# Patient Record
Sex: FEMALE | Race: WHITE | NOT HISPANIC OR LATINO | Employment: OTHER | URBAN - METROPOLITAN AREA
[De-identification: names, ages, dates, MRNs, and addresses within clinical notes are randomized per-mention and may not be internally consistent; named-entity substitution may affect disease eponyms.]

---

## 2023-09-20 ENCOUNTER — HOSPITAL ENCOUNTER (OUTPATIENT)
Dept: DATA CONVERSION | Facility: HOSPITAL | Age: 88
End: 2023-09-20
Attending: STUDENT IN AN ORGANIZED HEALTH CARE EDUCATION/TRAINING PROGRAM | Admitting: STUDENT IN AN ORGANIZED HEALTH CARE EDUCATION/TRAINING PROGRAM
Payer: MEDICARE

## 2023-09-20 DIAGNOSIS — N39.3 STRESS INCONTINENCE (FEMALE) (MALE): ICD-10-CM

## 2023-09-20 LAB
ANION GAP IN SER/PLAS: 11 MMOL/L (ref 10–20)
CALCIUM (MG/DL) IN SER/PLAS: 9.9 MG/DL (ref 8.6–10.3)
CARBON DIOXIDE, TOTAL (MMOL/L) IN SER/PLAS: 29 MMOL/L (ref 21–32)
CHLORIDE (MMOL/L) IN SER/PLAS: 104 MMOL/L (ref 98–107)
CREATININE (MG/DL) IN SER/PLAS: 1.07 MG/DL (ref 0.5–1.05)
ERYTHROCYTE DISTRIBUTION WIDTH (RATIO) BY AUTOMATED COUNT: 16.6 % (ref 11.5–14.5)
ERYTHROCYTE MEAN CORPUSCULAR HEMOGLOBIN CONCENTRATION (G/DL) BY AUTOMATED: 32.2 G/DL (ref 32–36)
ERYTHROCYTE MEAN CORPUSCULAR VOLUME (FL) BY AUTOMATED COUNT: 90 FL (ref 80–100)
ERYTHROCYTES (10*6/UL) IN BLOOD BY AUTOMATED COUNT: 4.1 X10E12/L (ref 4–5.2)
GFR FEMALE: 50 ML/MIN/1.73M2
GLUCOSE (MG/DL) IN SER/PLAS: 121 MG/DL (ref 74–99)
HEMATOCRIT (%) IN BLOOD BY AUTOMATED COUNT: 37 % (ref 36–46)
HEMOGLOBIN (G/DL) IN BLOOD: 11.9 G/DL (ref 12–16)
LEUKOCYTES (10*3/UL) IN BLOOD BY AUTOMATED COUNT: 8.7 X10E9/L (ref 4.4–11.3)
PLATELETS (10*3/UL) IN BLOOD AUTOMATED COUNT: 198 X10E9/L (ref 150–450)
POCT GLUCOSE: 111 MG/DL (ref 74–99)
POTASSIUM (MMOL/L) IN SER/PLAS: 3.4 MMOL/L (ref 3.5–5.3)
SODIUM (MMOL/L) IN SER/PLAS: 141 MMOL/L (ref 136–145)
UREA NITROGEN (MG/DL) IN SER/PLAS: 22 MG/DL (ref 6–23)

## 2023-09-21 LAB
ATRIAL RATE: 76 BPM
P AXIS: -19 DEGREES
PR INTERVAL: 185 MS
Q ONSET: 253 MS
QRS COUNT: 12 BEATS
QRS DURATION: 153 MS
QT INTERVAL: 424 MS
QTC CALCULATION(BAZETT): 477 MS
QTC FREDERICIA: 458 MS
R AXIS: 34 DEGREES
T AXIS: 8 DEGREES
T OFFSET: 465 MS
VENTRICULAR RATE: 76 BPM

## 2023-09-29 VITALS — WEIGHT: 159.83 LBS | HEIGHT: 61 IN | BODY MASS INDEX: 30.18 KG/M2

## 2023-09-30 NOTE — H&P
History of Present Illness:   History Present Illness:  Reason for surgery: JOSEPH   HPI:    Ms. Hernández is an 89yo with JOSEPH who presents for urethral bulking with Bulkamid.      Hx also notable for:   UUI:  Has tried Ditropan Myrbetriq and PTNS with no improvement  s/p botox 100 units, 1/13/21, 8/17/2021  Continue Premarin cream   doing great with SNM     Stress incontinence:  Wants to proceed with bulking        Allergies:        Allergies:  ·  No Known Allergies :     Home Medication Review:   Home Medications Reviewed: yes     Impression/Procedure:   ·  Impression and Planned Procedure: Bulkamid       ERAS (Enhanced Recovery After Surgery):  ·  ERAS Patient: no       Physical Exam by System:    Constitutional: A&Ox3   Respiratory/Thorax: No increased work of breathing,  comfortable on RA   Cardiovascular: Regular rate and rhythm   Gastrointestinal: Soft, non-tender   Genitourinary: Per HPI   Musculoskeletal: Normal ROM   Neurological: No acute deficits   Skin: No lesions     Consent:   COVID-19 Consent:  ·  COVID-19 Risk Consent Surgeon has reviewed key risks related to the risk of fan COVID-19 and if they contract COVID-19 what the risks are.     Attestation:   Note Completion:  I am a:  Resident/Fellow   Attending Attestation I saw and evaluated the patient.  I personally obtained the key and critical portions of the history and physical exam or was physically present for key and  critical portions performed by the resident/fellow. I reviewed the resident/fellow?s documentation and discussed the patient with the resident/fellow.  I agree with the resident/fellow?s medical decision making as documented in the note.     I personally evaluated the patient on 20-Sep-2023         Electronic Signatures:  Lorna Jones (Fellow))  (Signed 19-Sep-2023 16:33)   Authored: History of Present Illness, Allergies, Home  Medication Review, Impression/Procedure, ERAS, Physical Exam, Consent, Note  Completion  Abimael Umana)  (Signed 20-Sep-2023 08:25)   Authored: Note Completion   Co-Signer: History of Present Illness, Allergies, Home Medication Review, Impression/Procedure, ERAS, Physical Exam, Consent, Note Completion      Last Updated: 20-Sep-2023 08:25 by Abimael Umana)

## 2023-10-01 NOTE — OP NOTE
Post Operative Note:     PreOp Diagnosis: az   Post-Procedure Diagnosis: same   Procedure: 1. urethral bulking   Surgeon: tashia   Resident/Fellow/Other Assistant: none   Anesthesia: ga   Estimated Blood Loss (mL): none   Specimen: no   Findings: good cooptation     Operative Report Dictated:  Dictation: not applicable - note contains Operative  Report   Operative Report:    After the patient was placed under general anesthesia she was prepped and draped in the usual sterile fashion.     At the start of the end rotatable Bulkamid sheath and the water inflow was adjusted to produce an adequate stream. The Bulkamid Needle was then placed ¾ of the way into the needle channel of the rotatable sheath and the entire Bulkamid system was then  advanced into the urethra until the bladder is visualised and inspected. The Bulkamid needle was then advanced into the needle channel on the rotatable sheath until the tip of the sheath is adjacent to the bladder neck.    The sheath was then rotated to the 7 o?clock position. The needed was then extend into the bladder until the 2cm juni on the needle is visible. The Bulkamid system was then retracted until the tip of the needle was resting on the bladder neck.  The needle was then retracted into the sheath and approximately 1.5cm from the bladder neck the Bulkamid system was pressed parallel against the urethral wall and the needle is then advanced into the submucosal tissue ensuring that the bevel of the needle  was facing towards the lumen. The needle was advanced approximately 0.5cm, and the Bulkamid hydrogel was then injected until the Bulkamid cushion was visible and reached the midline of the urethral lumen.    The needle was then retracted back into the rotatable sheath, and rotated to the next injection site. Subsequent injections were preformed at 5 o?clock, 2 o?clock and 10 o?clock all along the same plane as the original injection until  all (4) cushions met at the  midline of the urethral lumen. 2mls Total of Bulkamid Hydrogel was used.    Approximately 500cc of fluid was left in the bladder and upon completion, the patient emptied her bladder without issues. The procedure was well tolerated and EBL was minimal.    Patient was given instruction to contact the office if she had any difficulty urinating and was consulted about the potential of a ?top up? procedure if the desired effect was not achieved. Prophylactic antibiotics were prescribed, and a  follow up appointment was scheduled to evaluate improvement.        Electronic Signatures:  Abimael Umana)  (Signed 20-Sep-2023 17:48)   Authored: Post Operative Note, Note Completion      Last Updated: 20-Sep-2023 17:48 by Abimael Umana)

## 2023-10-18 ENCOUNTER — ANCILLARY PROCEDURE (OUTPATIENT)
Dept: RADIOLOGY | Facility: CLINIC | Age: 88
End: 2023-10-18
Payer: MEDICARE

## 2023-10-18 DIAGNOSIS — R06.2 WHEEZING: ICD-10-CM

## 2023-10-18 PROCEDURE — 71046 X-RAY EXAM CHEST 2 VIEWS: CPT | Mod: FY

## 2023-10-18 PROCEDURE — 71046 X-RAY EXAM CHEST 2 VIEWS: CPT | Performed by: RADIOLOGY

## 2023-11-14 ENCOUNTER — OFFICE VISIT (OUTPATIENT)
Dept: UROLOGY | Facility: CLINIC | Age: 88
End: 2023-11-14
Payer: MEDICARE

## 2023-11-14 DIAGNOSIS — N32.81 OAB (OVERACTIVE BLADDER): Primary | ICD-10-CM

## 2023-11-14 DIAGNOSIS — N39.3 SUI (STRESS URINARY INCONTINENCE, FEMALE): ICD-10-CM

## 2023-11-14 PROCEDURE — 99214 OFFICE O/P EST MOD 30 MIN: CPT | Performed by: STUDENT IN AN ORGANIZED HEALTH CARE EDUCATION/TRAINING PROGRAM

## 2023-11-14 NOTE — PROGRESS NOTES
"CHIEF COMPLAINT: POV 7 weeks           HISTORY OF PRESENT ILLNESS:  This is a 88 y.o. female, who presents with a 7 week post operative visit. Patient is using her [employer] device and she does have less frequency and less leakage bladder symptoms. Patient is using pads but it varies with how much wetness there is on the pad. She is not getting up as much at nighttime. She has had at least one Botox treatment. This past September she had a urethral bulking procedure too. She states when she sits for long periods of time and then gets up is when she realized she has to go urinate. It is about half the time she is hurrying to the bathroom. She is going about 8 times per day. She is taking Gemtesa currently.              HISTORY OF PRESENT ILLNESS:           Past Medical History  She has a past medical history of Collagenous colitis (04/18/2022), Personal history of other specified conditions (06/15/2022), and Personal history of other specified conditions (06/15/2022).    Surgical History  She has a past surgical history that includes Other surgical history (07/30/2020); Other surgical history (07/30/2020); Other surgical history (07/30/2020); Other surgical history (08/10/2020); and Other surgical history (08/10/2020).     Social History  She has no history on file for tobacco use, alcohol use, and drug use.    Family History  No family history on file.     Allergies  Patient has no allergy information on record.      A comprehensive 10+ review of systems was negative except for: see hpi                          PHYSICAL EXAMINATION:  BP Readings from Last 3 Encounters:   05/09/23 137/81   04/13/23 180/82   05/13/22 146/73      Wt Readings from Last 3 Encounters:   05/09/23 69.4 kg (153 lb)   05/27/22 66 kg (145 lb 8.1 oz)   05/13/22 66 kg (145 lb 8.1 oz)      BMI: Estimated body mass index is 30.29 kg/m² as calculated from the following:    Height as of 9/20/23: 1.547 m (5' 0.91\").    Weight as of 9/20/23: 72.5 kg " "(159 lb 13.3 oz).  BSA: Estimated body surface area is 1.77 meters squared as calculated from the following:    Height as of 9/20/23: 1.547 m (5' 0.91\").    Weight as of 9/20/23: 72.5 kg (159 lb 13.3 oz).  HEENT: Normocephalic, atraumatic, PER EOMI, nonicteric, trachea normal, thyroid normal, oropharynx normal.  CARDIAC: regular rate & rhythm, S1 & S2 normal.  No heaves, thrills, gallops or murmurs.  LUNGS: Clear to auscultation, no spinal or CV tenderness.  EXTREMITIES: No evidence of cyanosis, clubbing or edema.                     Assessment:    88-year-old with urinary urge incontinence, likely underactive detrusor, and fecal incontinence     UUI:  Has tried Ditropan Myrbetriq and PTNS with no improvement  s/p botox 100 units, 1/13/21, 8/17/2021  Continue Premarin cream   doing great with SNM   Start timed voiding, and continue gemtesa       Follow up in 3 months    Stress incontinence:  S/p bulking  9/20/23 with marked improvement     Abimael Umana MD  "

## 2023-12-15 ENCOUNTER — DOCUMENTATION (OUTPATIENT)
Dept: UROLOGY | Facility: CLINIC | Age: 88
End: 2023-12-15
Payer: MEDICARE

## 2024-02-05 ENCOUNTER — LAB (OUTPATIENT)
Dept: LAB | Facility: LAB | Age: 89
End: 2024-02-05
Payer: MEDICARE

## 2024-02-05 DIAGNOSIS — N18.9 CHRONIC KIDNEY DISEASE, UNSPECIFIED: Primary | ICD-10-CM

## 2024-02-05 LAB
APPEARANCE UR: CLEAR
BILIRUB UR STRIP.AUTO-MCNC: NEGATIVE MG/DL
COLOR UR: YELLOW
GLUCOSE UR STRIP.AUTO-MCNC: NEGATIVE MG/DL
KETONES UR STRIP.AUTO-MCNC: NEGATIVE MG/DL
LEUKOCYTE ESTERASE UR QL STRIP.AUTO: NEGATIVE
NITRITE UR QL STRIP.AUTO: NEGATIVE
PH UR STRIP.AUTO: 7 [PH]
PROT UR STRIP.AUTO-MCNC: NEGATIVE MG/DL
RBC # UR STRIP.AUTO: NEGATIVE /UL
SP GR UR STRIP.AUTO: 1.01
UROBILINOGEN UR STRIP.AUTO-MCNC: <2 MG/DL

## 2024-02-05 PROCEDURE — 81003 URINALYSIS AUTO W/O SCOPE: CPT

## 2024-02-05 PROCEDURE — 80048 BASIC METABOLIC PNL TOTAL CA: CPT

## 2024-02-05 PROCEDURE — 83521 IG LIGHT CHAINS FREE EACH: CPT

## 2024-02-05 PROCEDURE — 84156 ASSAY OF PROTEIN URINE: CPT

## 2024-02-05 PROCEDURE — 36415 COLL VENOUS BLD VENIPUNCTURE: CPT

## 2024-02-05 PROCEDURE — 82570 ASSAY OF URINE CREATININE: CPT

## 2024-02-06 LAB
ANION GAP SERPL CALC-SCNC: 12 MMOL/L (ref 10–20)
BUN SERPL-MCNC: 27 MG/DL (ref 6–23)
CALCIUM SERPL-MCNC: 9.5 MG/DL (ref 8.6–10.3)
CHLORIDE SERPL-SCNC: 104 MMOL/L (ref 98–107)
CO2 SERPL-SCNC: 30 MMOL/L (ref 21–32)
CREAT SERPL-MCNC: 1.17 MG/DL (ref 0.5–1.05)
CREAT UR-MCNC: 47.9 MG/DL (ref 20–320)
EGFRCR SERPLBLD CKD-EPI 2021: 45 ML/MIN/1.73M*2
GLUCOSE SERPL-MCNC: 146 MG/DL (ref 74–99)
KAPPA LC SERPL-MCNC: 3.3 MG/DL (ref 0.33–1.94)
KAPPA LC/LAMBDA SER: 1.47 {RATIO} (ref 0.26–1.65)
LAMBDA LC SERPL-MCNC: 2.25 MG/DL (ref 0.57–2.63)
POTASSIUM SERPL-SCNC: 3.6 MMOL/L (ref 3.5–5.3)
PROT UR-ACNC: 5 MG/DL (ref 5–24)
PROT/CREAT UR: 0.1 MG/MG CREAT (ref 0–0.17)
SODIUM SERPL-SCNC: 142 MMOL/L (ref 136–145)

## 2024-02-12 ENCOUNTER — HOSPITAL ENCOUNTER (OUTPATIENT)
Dept: RADIOLOGY | Facility: CLINIC | Age: 89
Discharge: HOME | End: 2024-02-12
Payer: MEDICARE

## 2024-02-12 DIAGNOSIS — R05.9 COUGH, UNSPECIFIED: ICD-10-CM

## 2024-02-12 PROCEDURE — 71046 X-RAY EXAM CHEST 2 VIEWS: CPT

## 2024-02-12 PROCEDURE — 71046 X-RAY EXAM CHEST 2 VIEWS: CPT | Performed by: RADIOLOGY

## 2024-02-13 ENCOUNTER — OFFICE VISIT (OUTPATIENT)
Dept: UROLOGY | Facility: CLINIC | Age: 89
End: 2024-02-13
Payer: MEDICARE

## 2024-02-13 DIAGNOSIS — N32.81 OAB (OVERACTIVE BLADDER): Primary | ICD-10-CM

## 2024-02-13 DIAGNOSIS — N39.3 SUI (STRESS URINARY INCONTINENCE, FEMALE): ICD-10-CM

## 2024-02-13 DIAGNOSIS — N39.0 RECURRENT UTI: ICD-10-CM

## 2024-02-13 PROCEDURE — 99213 OFFICE O/P EST LOW 20 MIN: CPT | Performed by: STUDENT IN AN ORGANIZED HEALTH CARE EDUCATION/TRAINING PROGRAM

## 2024-02-13 NOTE — PROGRESS NOTES
"CHIEF COMPLAINT: FUV         HISTORY OF PRESENT ILLNESS:  Danielle Hernández is a 87 y/o female presenting on 2/13/24 for a follow up visit. She is overall doing well and her symptoms are being well-controlled         Past Medical History  She has a past medical history of Collagenous colitis (04/18/2022), Personal history of other specified conditions (06/15/2022), and Personal history of other specified conditions (06/15/2022).    Surgical History  She has a past surgical history that includes Other surgical history (07/30/2020); Other surgical history (07/30/2020); Other surgical history (07/30/2020); Other surgical history (08/10/2020); and Other surgical history (08/10/2020).     Social History  She has no history on file for tobacco use, alcohol use, and drug use.    Family History  No family history on file.     Allergies  Patient has no allergy information on record.      A comprehensive 10+ review of systems was negative except for: see hpi                          PHYSICAL EXAMINATION:  BP Readings from Last 3 Encounters:   05/09/23 137/81   04/13/23 180/82   05/13/22 146/73      Wt Readings from Last 3 Encounters:   05/09/23 69.4 kg (153 lb)   05/27/22 66 kg (145 lb 8.1 oz)   05/13/22 66 kg (145 lb 8.1 oz)      BMI:   Estimated body mass index is 30.29 kg/m² as calculated from the following:    Height as of 9/20/23: 1.547 m (5' 0.91\").    Weight as of 9/20/23: 72.5 kg (159 lb 13.3 oz).  BSA:   Estimated body surface area is 1.77 meters squared as calculated from the following:    Height as of 9/20/23: 1.547 m (5' 0.91\").    Weight as of 9/20/23: 72.5 kg (159 lb 13.3 oz).  HEENT: Normocephalic, atraumatic, PER EOMI, nonicteric, trachea normal, thyroid normal, oropharynx normal.  CARDIAC: regular rate & rhythm, S1 & S2 normal.  No heaves, thrills, gallops or murmurs.  LUNGS: Clear to auscultation, no spinal or CV tenderness.  EXTREMITIES: No evidence of cyanosis, clubbing or edema.           Provider " Impressions:  88-year-old with urinary urge incontinence, likely underactive detrusor, and fecal incontinence     UUI:  Has tried Ditropan Myrbetriq and PTNS with no improvement  s/p botox 100 units, 1/13/21, 8/17/2021  Continue Premarin cream   doing great with SNM   continue timed voiding, and refill gemtesa        Stress incontinence:  S/p bulking  9/20/23 with marked improvement    Follow up in 3 months      Abimael Umana MD    By signing my name below, IJoe Scribe   attest that this documentation has been prepared under the direction and in the presence of Dr. Abimael Umana.

## 2024-05-02 ENCOUNTER — DOCUMENTATION (OUTPATIENT)
Dept: UROLOGY | Facility: CLINIC | Age: 89
End: 2024-05-02
Payer: MEDICARE

## 2024-05-14 ENCOUNTER — OFFICE VISIT (OUTPATIENT)
Dept: UROLOGY | Facility: CLINIC | Age: 89
End: 2024-05-14
Payer: MEDICARE

## 2024-05-14 DIAGNOSIS — N39.0 URINARY TRACT INFECTION WITHOUT HEMATURIA, SITE UNSPECIFIED: ICD-10-CM

## 2024-05-14 DIAGNOSIS — N39.3 SUI (STRESS URINARY INCONTINENCE, FEMALE): Primary | ICD-10-CM

## 2024-05-14 PROCEDURE — 99213 OFFICE O/P EST LOW 20 MIN: CPT | Performed by: STUDENT IN AN ORGANIZED HEALTH CARE EDUCATION/TRAINING PROGRAM

## 2024-05-14 PROCEDURE — 87086 URINE CULTURE/COLONY COUNT: CPT

## 2024-05-14 NOTE — PROGRESS NOTES
"HISTORY OF PRESENT ILLNESS:  Danielle Hernández is a 88 y.o. female who presents today for a follow up visit. She reports she is leaking more now. She states the gemtesa is working, but having more frequency or urgency. She states nobody has reached out to her for her SNM device.            Past Medical History  She has a past medical history of Collagenous colitis (04/18/2022), Personal history of other specified conditions (06/15/2022), and Personal history of other specified conditions (06/15/2022).    Surgical History  She has a past surgical history that includes Other surgical history (07/30/2020); Other surgical history (07/30/2020); Other surgical history (07/30/2020); Other surgical history (08/10/2020); and Other surgical history (08/10/2020).     Social History  She has no history on file for tobacco use, alcohol use, and drug use.    Family History  No family history on file.     Allergies  Patient has no allergy information on record.      A comprehensive 10+ review of systems was negative except for: see hpi                          PHYSICAL EXAMINATION:  BP Readings from Last 3 Encounters:   05/09/23 137/81   04/13/23 180/82   05/13/22 146/73      Wt Readings from Last 3 Encounters:   05/09/23 69.4 kg (153 lb)   05/27/22 66 kg (145 lb 8.1 oz)   05/13/22 66 kg (145 lb 8.1 oz)      BMI: Estimated body mass index is 30.29 kg/m² as calculated from the following:    Height as of 9/20/23: 1.547 m (5' 0.91\").    Weight as of 9/20/23: 72.5 kg (159 lb 13.3 oz).  BSA: Estimated body surface area is 1.77 meters squared as calculated from the following:    Height as of 9/20/23: 1.547 m (5' 0.91\").    Weight as of 9/20/23: 72.5 kg (159 lb 13.3 oz).  HEENT: Normocephalic, atraumatic, PER EOMI, nonicteric, trachea normal, thyroid normal, oropharynx normal.  CARDIAC: regular rate & rhythm, S1 & S2 normal.  No heaves, thrills, gallops or murmurs.  LUNGS: Clear to auscultation, no spinal or CV tenderness.  EXTREMITIES: " No evidence of cyanosis, clubbing or edema.               Assessment:  88-year-old with urinary urge incontinence, likely underactive detrusor, and fecal incontinence     UUI:  Has tried Ditropan Myrbetriq and PTNS with no improvement  s/p botox 100 units, 1/13/21, 8/17/2021  Continue Premarin cream   Increased stim and widened pulse width   continue timed voiding, and refill gemtesa         Stress incontinence:  S/p bulking  9/20/23 with marked improvement     Follow up in 3 months      All questions and concerns were answered and addressed.  The patient expressed understanding and agrees with the plan.     Abimael Umana MD    Scribe Attestation  By signing my name below, I, Joann Castanon, Scribe   attest that this documentation has been prepared under the direction and in the presence of Abimael Umana MD.

## 2024-05-16 LAB — BACTERIA UR CULT: ABNORMAL

## 2024-06-03 ENCOUNTER — DOCUMENTATION (OUTPATIENT)
Dept: UROLOGY | Facility: CLINIC | Age: 89
End: 2024-06-03
Payer: MEDICARE

## 2024-07-19 ENCOUNTER — LAB (OUTPATIENT)
Dept: LAB | Facility: LAB | Age: 89
End: 2024-07-19
Payer: MEDICARE

## 2024-07-19 DIAGNOSIS — N18.31 CHRONIC KIDNEY DISEASE, STAGE 3A (MULTI): Primary | ICD-10-CM

## 2024-07-19 DIAGNOSIS — R94.6 ABNORMAL RESULTS OF THYROID FUNCTION STUDIES: ICD-10-CM

## 2024-07-19 LAB
25(OH)D3 SERPL-MCNC: 25 NG/ML (ref 30–100)
ALBUMIN SERPL BCP-MCNC: 4 G/DL (ref 3.4–5)
ANION GAP SERPL CALC-SCNC: 13 MMOL/L (ref 10–20)
BUN SERPL-MCNC: 24 MG/DL (ref 6–23)
CALCIUM SERPL-MCNC: 10 MG/DL (ref 8.6–10.3)
CHLORIDE SERPL-SCNC: 105 MMOL/L (ref 98–107)
CO2 SERPL-SCNC: 27 MMOL/L (ref 21–32)
CREAT SERPL-MCNC: 1.13 MG/DL (ref 0.5–1.05)
EGFRCR SERPLBLD CKD-EPI 2021: 47 ML/MIN/1.73M*2
ERYTHROCYTE [DISTWIDTH] IN BLOOD BY AUTOMATED COUNT: 17.8 % (ref 11.5–14.5)
GLUCOSE SERPL-MCNC: 105 MG/DL (ref 74–99)
HCT VFR BLD AUTO: 37.2 % (ref 36–46)
HGB BLD-MCNC: 11.6 G/DL (ref 12–16)
MCH RBC QN AUTO: 28.6 PG (ref 26–34)
MCHC RBC AUTO-ENTMCNC: 31.2 G/DL (ref 32–36)
MCV RBC AUTO: 92 FL (ref 80–100)
NRBC BLD-RTO: 0 /100 WBCS (ref 0–0)
PHOSPHATE SERPL-MCNC: 2.6 MG/DL (ref 2.5–4.9)
PLATELET # BLD AUTO: 270 X10*3/UL (ref 150–450)
POTASSIUM SERPL-SCNC: 3.7 MMOL/L (ref 3.5–5.3)
PTH-INTACT SERPL-MCNC: 168.2 PG/ML (ref 18.5–88)
RBC # BLD AUTO: 4.06 X10*6/UL (ref 4–5.2)
SODIUM SERPL-SCNC: 141 MMOL/L (ref 136–145)
TSH SERPL-ACNC: 3.89 MIU/L (ref 0.44–3.98)
WBC # BLD AUTO: 11.3 X10*3/UL (ref 4.4–11.3)

## 2024-07-19 PROCEDURE — 83970 ASSAY OF PARATHORMONE: CPT

## 2024-07-19 PROCEDURE — 80069 RENAL FUNCTION PANEL: CPT

## 2024-07-19 PROCEDURE — 36415 COLL VENOUS BLD VENIPUNCTURE: CPT

## 2024-07-19 PROCEDURE — 85027 COMPLETE CBC AUTOMATED: CPT

## 2024-07-19 PROCEDURE — 84443 ASSAY THYROID STIM HORMONE: CPT

## 2024-07-19 PROCEDURE — 82306 VITAMIN D 25 HYDROXY: CPT

## 2024-08-06 ENCOUNTER — APPOINTMENT (OUTPATIENT)
Dept: UROLOGY | Facility: CLINIC | Age: 89
End: 2024-08-06
Payer: MEDICARE

## 2024-08-06 DIAGNOSIS — N32.81 OAB (OVERACTIVE BLADDER): ICD-10-CM

## 2024-08-06 DIAGNOSIS — N39.0 RECURRENT UTI: Primary | ICD-10-CM

## 2024-08-06 LAB
POC APPEARANCE, URINE: CLEAR
POC BILIRUBIN, URINE: NEGATIVE
POC BLOOD, URINE: NEGATIVE
POC COLOR, URINE: YELLOW
POC GLUCOSE, URINE: NEGATIVE MG/DL
POC KETONES, URINE: NEGATIVE MG/DL
POC LEUKOCYTES, URINE: NEGATIVE
POC NITRITE,URINE: NEGATIVE
POC PH, URINE: 6.5 PH
POC PROTEIN, URINE: NEGATIVE MG/DL
POC SPECIFIC GRAVITY, URINE: 1.01
POC UROBILINOGEN, URINE: 0.2 EU/DL

## 2024-08-06 PROCEDURE — 1159F MED LIST DOCD IN RCRD: CPT

## 2024-08-06 PROCEDURE — 99204 OFFICE O/P NEW MOD 45 MIN: CPT

## 2024-08-06 PROCEDURE — 81003 URINALYSIS AUTO W/O SCOPE: CPT

## 2024-08-06 PROCEDURE — 1160F RVW MEDS BY RX/DR IN RCRD: CPT

## 2024-08-06 PROCEDURE — 87086 URINE CULTURE/COLONY COUNT: CPT

## 2024-08-06 NOTE — PROGRESS NOTES
[facility]  Outpatient Clinic Note    Patient: Danielle Hernández  Age/Sex: 89 y.o., female  MRN: [id number]    Chief Complaint: 3-month follow-up         History of Present Illness  This is a 89 y.o. female, who presents for follow-up for medication management.  The patient last saw Dr. Umana on 5/14/2024.  The patient currently has a sacral neuromodulation device.  Last appointment with Dr. Umana they increased the stim and widen the pulse width.  She is currently taking Gemtesa and reports she is going to the bathroom 24 times a day.  The patient stated since April she has not had any relief of her symptoms. She denies dysuria, gross hematuria, flank pain, pelvic pain, fever or chills.      Past Medical & Surgical History  Past Medical History:   Diagnosis Date    Collagenous colitis 04/18/2022    Collagenous colitis    Personal history of other specified conditions 06/15/2022    History of diarrhea    Personal history of other specified conditions 06/15/2022    History of incontinence of feces     Past Surgical History:   Procedure Laterality Date    OTHER SURGICAL HISTORY  07/30/2020    Hernia repair    OTHER SURGICAL HISTORY  07/30/2020    Breast biopsy    OTHER SURGICAL HISTORY  07/30/2020    Cholecystectomy    OTHER SURGICAL HISTORY  08/10/2020    Arm surgery    OTHER SURGICAL HISTORY  08/10/2020    Bladder surgery       Family History  No family history on file.    Social History  She has no history on file for tobacco use, alcohol use, and drug use.    Allergies  Patient has no allergy information on record.    Medications:  No current outpatient medications on file prior to visit.     No current facility-administered medications on file prior to visit.        Review of Systems   A comprehensive 10+ review of systems was negative except for: see hpi          Physical Exam                                                                                                                      General: Well  developed, well nourished, alert and cooperative, appears in no acute distress  Head: Normocephalic, atraumatic  Neck: supple, trachea midline  Eyes: Non-injected conjunctiva, sclera clear, no proptosis  Cardiac: Extremities are warm and well perfused. No edema, cyanosis or pallor.   Lungs: Breathing is easy, non-labored. Speaking in clear and complete sentences. Normal diaphragmatic movement.  Abdomen: soft, non-distended, non-tender, no rebound or guarding, no hernia and no CVA tenderness   MSK: Ambulatory with steady gait, unassisted  Neuro: alert and oriented to person, place and time  Psych: Demonstrates good judgement and reason, without hallucinations, abnormal affect or abnormal behaviors.  Skin: no obvious lesions, no rashes      Labs  N/A    Imaging  N/A    IMPRESSION AND PLAN:  This is a 89 y.o. female, with urinary urge incontinence, likely underactive detrusor, and fecal incontinence     UUI:  -Has tried Ditropan Myrbetriq and PTNS with no improvement  -s/p botox 100 units, 1/13/21, 8/17/2021  -Continue Premarin cream   -Increased stim and widened pulse width   -continue timed voiding  -Stop Gemtesa  -We increased her settings, [employer] rep will contact the patient     Stress incontinence:  -S/p bulking  9/20/23 with marked improvement    We will send out a urine culture today we will call her with the results     Follow up in 6 weeks    All questions and concerns were answered and addressed.  The patient expressed understanding and agrees with the plan.     Reviewed and approved by PHILL LAWLER on 8/6/24 at 7:51 AM.

## 2024-08-08 ENCOUNTER — TELEPHONE (OUTPATIENT)
Dept: UROLOGY | Facility: CLINIC | Age: 89
End: 2024-08-08
Payer: MEDICARE

## 2024-08-08 LAB — BACTERIA UR CULT: NORMAL

## 2024-08-08 NOTE — TELEPHONE ENCOUNTER
----- Message from Veda Becker sent at 8/8/2024  9:58 AM EDT -----  Regarding: negative urine culture  Will you call her and let her know her urine culture is negative?  Thank you  ----- Message -----  From: Odalys Nix MA  Sent: 8/6/2024   3:25 PM EDT  To: Veda Becker PA-C

## 2024-08-26 ENCOUNTER — TELEPHONE (OUTPATIENT)
Dept: GASTROENTEROLOGY | Facility: CLINIC | Age: 89
End: 2024-08-26
Payer: MEDICARE

## 2024-09-04 ENCOUNTER — TELEPHONE (OUTPATIENT)
Dept: GASTROENTEROLOGY | Facility: CLINIC | Age: 89
End: 2024-09-04
Payer: MEDICARE

## 2024-09-04 NOTE — TELEPHONE ENCOUNTER
----- Message from Tacos QUIÑONES sent at 9/4/2024  1:21 PM EDT -----  Regarding: RE: schedule NP OV in Oct  Scheduled with Dr. Gould for 10/7/24  ----- Message -----  From: Tacos Garsia  Sent: 9/4/2024  12:00 AM EDT  To: [id number]  Subject: schedule NP OV in Oct                            Receieved a faxed referral from Dr. Gustavo Hart for fecal incntinence

## 2024-09-17 ENCOUNTER — APPOINTMENT (OUTPATIENT)
Dept: UROLOGY | Facility: CLINIC | Age: 89
End: 2024-09-17
Payer: MEDICARE

## 2024-09-24 ENCOUNTER — TELEPHONE (OUTPATIENT)
Dept: UROLOGY | Facility: CLINIC | Age: 89
End: 2024-09-24
Payer: MEDICARE

## 2024-09-25 ENCOUNTER — APPOINTMENT (OUTPATIENT)
Dept: UROLOGY | Facility: CLINIC | Age: 89
End: 2024-09-25
Payer: MEDICARE

## 2024-10-01 ENCOUNTER — APPOINTMENT (OUTPATIENT)
Dept: UROLOGY | Facility: CLINIC | Age: 89
End: 2024-10-01
Payer: MEDICARE

## 2024-10-07 ENCOUNTER — APPOINTMENT (OUTPATIENT)
Dept: GASTROENTEROLOGY | Facility: CLINIC | Age: 89
End: 2024-10-07
Payer: MEDICARE

## 2024-10-07 VITALS
WEIGHT: 172 LBS | SYSTOLIC BLOOD PRESSURE: 147 MMHG | BODY MASS INDEX: 31.65 KG/M2 | OXYGEN SATURATION: 94 % | DIASTOLIC BLOOD PRESSURE: 85 MMHG | HEART RATE: 89 BPM | HEIGHT: 62 IN

## 2024-10-07 DIAGNOSIS — R15.9 INCONTINENCE OF FECES, UNSPECIFIED FECAL INCONTINENCE TYPE: Primary | ICD-10-CM

## 2024-10-07 PROBLEM — M75.40 IMPINGEMENT SYNDROME OF SHOULDER REGION: Status: ACTIVE | Noted: 2024-10-07

## 2024-10-07 PROBLEM — R19.7 DIARRHEA: Status: ACTIVE | Noted: 2024-10-07

## 2024-10-07 PROBLEM — N95.2 ATROPHIC VAGINITIS: Status: ACTIVE | Noted: 2024-10-07

## 2024-10-07 PROBLEM — F32.4 MAJOR DEPRESSIVE DISORDER IN PARTIAL REMISSION (CMS-HCC): Status: ACTIVE | Noted: 2018-07-12

## 2024-10-07 PROBLEM — R25.2 CRAMPING OF HANDS: Status: ACTIVE | Noted: 2018-07-12

## 2024-10-07 PROBLEM — M54.2 NECK PAIN: Status: ACTIVE | Noted: 2018-03-21

## 2024-10-07 PROBLEM — L85.3 DRY SKIN DERMATITIS: Status: ACTIVE | Noted: 2018-07-12

## 2024-10-07 PROBLEM — M46.1 INFLAMMATION OF RIGHT SACROILIAC JOINT (CMS-HCC): Status: ACTIVE | Noted: 2024-07-19

## 2024-10-07 PROBLEM — S42.409A FRACTURE OF DISTAL END OF HUMERUS: Status: RESOLVED | Noted: 2023-01-09 | Resolved: 2024-10-07

## 2024-10-07 PROBLEM — I83.92 VARICOSE VEINS OF LEFT LOWER EXTREMITY: Status: ACTIVE | Noted: 2019-06-13

## 2024-10-07 PROBLEM — N39.46 MIXED STRESS AND URGE URINARY INCONTINENCE: Status: ACTIVE | Noted: 2023-09-20

## 2024-10-07 PROBLEM — M54.17 LUMBOSACRAL NEURITIS: Status: ACTIVE | Noted: 2024-10-07

## 2024-10-07 PROBLEM — E78.00 HIGH CHOLESTEROL: Status: ACTIVE | Noted: 2024-10-07

## 2024-10-07 PROBLEM — D36.9 TUBULAR ADENOMA: Status: ACTIVE | Noted: 2017-05-05

## 2024-10-07 PROBLEM — N18.30 CHRONIC KIDNEY DISEASE, STAGE 3, MOD DECREASED GFR (MULTI): Status: ACTIVE | Noted: 2018-07-12

## 2024-10-07 PROBLEM — R53.1 WEAKNESS: Status: ACTIVE | Noted: 2023-04-13

## 2024-10-07 PROBLEM — J44.9 CHRONIC OBSTRUCTIVE PULMONARY DISEASE (MULTI): Status: ACTIVE | Noted: 2018-07-12

## 2024-10-07 PROBLEM — M43.10 SPONDYLOLISTHESIS, ACQUIRED: Status: ACTIVE | Noted: 2024-10-07

## 2024-10-07 PROBLEM — R33.9 INCOMPLETE BLADDER EMPTYING: Status: ACTIVE | Noted: 2024-10-07

## 2024-10-07 PROBLEM — M48.061 SPINAL STENOSIS OF LUMBAR REGION: Status: ACTIVE | Noted: 2017-09-08

## 2024-10-07 PROBLEM — K52.831 COLLAGENOUS COLITIS: Status: ACTIVE | Noted: 2024-10-07

## 2024-10-07 PROBLEM — M53.3 COCCYDYNIA: Status: ACTIVE | Noted: 2019-03-08

## 2024-10-07 PROBLEM — M77.8 TENDINITIS OF SHOULDER: Status: ACTIVE | Noted: 2024-10-07

## 2024-10-07 PROBLEM — M16.11 PRIMARY OSTEOARTHRITIS OF RIGHT HIP: Status: ACTIVE | Noted: 2019-05-13

## 2024-10-07 PROBLEM — M47.812 CERVICAL SPONDYLOSIS WITHOUT MYELOPATHY: Status: ACTIVE | Noted: 2018-11-13

## 2024-10-07 PROBLEM — M50.30 DDD (DEGENERATIVE DISC DISEASE), CERVICAL: Status: ACTIVE | Noted: 2018-11-13

## 2024-10-07 PROBLEM — R29.898 RIGHT LEG WEAKNESS: Status: ACTIVE | Noted: 2019-03-08

## 2024-10-07 PROBLEM — L57.0 ACTINIC KERATOSIS: Status: ACTIVE | Noted: 2018-03-21

## 2024-10-07 PROBLEM — E66.811 OBESITY, CLASS I, BMI 30-34.9: Status: ACTIVE | Noted: 2019-06-13

## 2024-10-07 PROBLEM — M75.50 BURSITIS OF SHOULDER: Status: ACTIVE | Noted: 2024-10-07

## 2024-10-07 PROBLEM — R06.02 SOB (SHORTNESS OF BREATH): Status: ACTIVE | Noted: 2019-06-13

## 2024-10-07 PROBLEM — N32.81 OAB (OVERACTIVE BLADDER): Status: ACTIVE | Noted: 2018-03-21

## 2024-10-07 PROBLEM — I10 ESSENTIAL HYPERTENSION: Status: ACTIVE | Noted: 2017-05-05

## 2024-10-07 PROBLEM — M81.0 AGE-RELATED OSTEOPOROSIS WITHOUT CURRENT PATHOLOGICAL FRACTURE: Status: ACTIVE | Noted: 2017-05-19

## 2024-10-07 PROBLEM — N39.0 ACUTE URINARY TRACT INFECTION: Status: RESOLVED | Noted: 2024-10-07 | Resolved: 2024-10-07

## 2024-10-07 PROCEDURE — 1160F RVW MEDS BY RX/DR IN RCRD: CPT | Performed by: INTERNAL MEDICINE

## 2024-10-07 PROCEDURE — 3079F DIAST BP 80-89 MM HG: CPT | Performed by: INTERNAL MEDICINE

## 2024-10-07 PROCEDURE — 3077F SYST BP >= 140 MM HG: CPT | Performed by: INTERNAL MEDICINE

## 2024-10-07 PROCEDURE — 1159F MED LIST DOCD IN RCRD: CPT | Performed by: INTERNAL MEDICINE

## 2024-10-07 PROCEDURE — 1036F TOBACCO NON-USER: CPT | Performed by: INTERNAL MEDICINE

## 2024-10-07 PROCEDURE — 99214 OFFICE O/P EST MOD 30 MIN: CPT | Performed by: INTERNAL MEDICINE

## 2024-10-07 RX ORDER — HYDROCHLOROTHIAZIDE 25 MG/1
25 TABLET ORAL
COMMUNITY

## 2024-10-07 RX ORDER — ALBUTEROL SULFATE 90 UG/1
INHALANT RESPIRATORY (INHALATION) EVERY 4 HOURS
COMMUNITY
Start: 2020-07-30

## 2024-10-07 RX ORDER — ATORVASTATIN CALCIUM 10 MG/1
TABLET, FILM COATED ORAL
COMMUNITY
Start: 2020-07-30

## 2024-10-07 RX ORDER — LOSARTAN POTASSIUM 100 MG/1
100 TABLET ORAL DAILY
COMMUNITY

## 2024-10-07 RX ORDER — FLUTICASONE PROPIONATE 50 MCG
1 SPRAY, SUSPENSION (ML) NASAL 2 TIMES DAILY
COMMUNITY

## 2024-10-07 RX ORDER — CEPHALEXIN 250 MG/1
250 CAPSULE ORAL DAILY
COMMUNITY

## 2024-10-07 RX ORDER — OMEPRAZOLE 20 MG/1
CAPSULE, DELAYED RELEASE ORAL EVERY 24 HOURS
COMMUNITY
Start: 2021-07-21

## 2024-10-07 RX ORDER — GABAPENTIN 100 MG/1
300 CAPSULE ORAL 3 TIMES DAILY PRN
COMMUNITY

## 2024-10-07 RX ORDER — METHYLCELLULOSE 500 MG/1
TABLET ORAL EVERY 4 HOURS
COMMUNITY

## 2024-10-07 RX ORDER — ACETAMINOPHEN 500 MG/1
CAPSULE, LIQUID FILLED ORAL EVERY 6 HOURS
COMMUNITY

## 2024-10-07 NOTE — PATIENT INSTRUCTIONS
Your primary care provider stated that one reason for consultation was that you may need an additional stimulator. I do not place or manage spinal stimulators, bladder stimulators or any other type of stimulator.    Pelvic floor weakness/dysfunction can contribute to urinary incontinence and fecal incontinence. I have ordered a referral to physical therapy for pelvic floor dysfunction.    Continue using a fiber supplement to add additional bulk to the stool which can make it easier to control.      If you are having multiple loose/watery bowel movements each day that is going to make it harder to control and avoid accidents. On those days you can try using Imodium. You can take this as needed. I would suggest that you start with a low dose (1 pill or half a pill) to see how it will work. If you need to take it regularly that is okay. You should watch to make sure that it is not causing constipation.      Follow up with GI as needed.

## 2024-10-07 NOTE — LETTER
October 8, 2024     Perla Coleman MD  [address]  [facility] [address]    Patient: Danielle Hernández   YOB: 1935   Date of Visit: 10/7/2024       Dear Dr. Perla Coleman MD:    Thank you for referring Danielle Hernández to me for evaluation. Below are my notes for this consultation.  If you have questions, please do not hesitate to call me. I look forward to following your patient along with you.       Sincerely,     Houston Gould MD      CC: No Recipients  ______________________________________________________________________________________        [facility]    ASSESSMENT and PLAN:       Danielle Hernández is a 89 y.o. female with a significant past medical history of CKD, COPD, HTN, obesity, OA, HLD, depression, and spinal stenosis who presents for consultation requested by her primary care provider (Perla Coleman MD) for the evaluation of “history of urine incontinence with effective bladder stimulator, now have bowel incontinence, please evaluate and treat, may need additional stimulator”.       Loose stool and fecal incontinence  She has a long standing history of intermittent loose stool and urinary/fecal incontinence. There is a reported history of microscopic colitis (collagenous colitis), but current symptoms are not consistent with microscopic colitis. Suspect that many of her symptoms are related to pelvic floor dysfunction. I discussed the role of physical therapy and also recommend a bulk forming fiber supplement. On days when she has looser/frequent stool she could try using low dose Imodium, but would be cautious to avoid any issues with constipation. She previously has had adenomatous polyps, but at this age I would not recommend additional colonoscopy and she is in agreement stating that she would not want a colonoscopy even if one were recommended.      Follow up with GI as needed.          Houston Gould MD        Senior Attending Physician,  Gastroenterology    [facility]    Clinical   [employer] [employer]        Subjective  HISTORY OF PRESENT ILLNESS:     Chief Complaint  Encopresis    History Of Present Illness:    Danielle Hernández is a 89 y.o. female with a significant past medical history of CKD, COPD, HTN, obesity, OA, HLD, depression, and spinal stenosis who presents for consultation requested by her primary care provider (Perla Coleman MD) for the evaluation of “history of urine incontinence with effective bladder stimulator, now have bowel incontinence, please evaluate and treat, may need additional stimulator”.     A scanned note from her PCP dated 8/20/2024 noted that she was having fecal incontinence for 8 months.    She was seen by GI (Leatha De La Torre, APRN-CNP) in 2022 for diarrhea and fecal incontinence. At that time there was a reported history of collagenous colitis although after her initial visit reports from her previous colonoscopies were obtained and there were no biopsy results that showed microscopic colitis.  Colonoscopy was recommended, but patient refused. Labs were checked which included a normal stool pathogen panel, negative C diff, normal fecal elastase, and elevated calprotectin (223).    There is a scanned document that includes a handwritten page that indicates collagenous colitis, but no date is included and no formal pathology report is included.    At the time of her last follow up with Leatha De La Torre, a course of Budesonide was recommended. Today she says that she can't recall if she took that medication or not. She also can't say if her symptoms improved on Budesonide.      Currently she says that she does not have much control over the bowels. She can typically tell that she is going to have a BM, but she can't make it to the bathroom in time about 3 times per month. She says that she has had issues with bowel  movements for years, but she can't say for sure how long her symptoms have been present. She usually has 2 BMs per day. She will at times have 3-5 BMs in a day and on those days stool is usually looser and that is often when she will have an accident. She says that she continues to have urinary incontinence. She follows with a urologist for her urinary frequency/incontinence and has undergone bladder stimulator implant in the past. She does say that her urologist has told her that she has pelvic floor weakness/dysfunction. No blood in her stools.      Patient denies any heartburn/GERD, N/V, dysphagia, odynophagia, abdominal pain, constipation, hematemesis, hematochezia, melena, or weight loss.    Endoscopy History:  2016 - EGD: 2 membranous rings of distal esophagus dilated up to 18mm, normal stomach, normal duodenum  2/16/2017 - Colonoscopy: cecum polyp (TA on path), ascending polyp (TA on path), diverticulosis, hemorrhoids, otherwise normal mucosa  10/9/2020 - EGD: normal esophagus (esophagitis on path), esophagus empirically dilated, gastric erythema (gastritis with no H pylori on path), single gastric nodule, normal duodenum      Review of systems:   Review of Systems   Constitutional:  Positive for fatigue. Negative for chills, fever and unexpected weight change.   HENT:  Negative for congestion, sneezing, sore throat, trouble swallowing and voice change.    Respiratory:  Negative for cough, shortness of breath and wheezing.    Cardiovascular:  Negative for chest pain, palpitations and leg swelling.   Gastrointestinal:         As detailed above.   Genitourinary:  Positive for enuresis and frequency. Negative for dysuria and hematuria.   Musculoskeletal:  Positive for arthralgias. Negative for myalgias.   Skin:  Negative for pallor and rash.   Neurological:  Negative for dizziness, seizures, syncope, weakness, numbness and headaches.   Hematological:  Negative for adenopathy. Does not bruise/bleed easily.    Psychiatric/Behavioral:  Negative for confusion. The patient is not nervous/anxious.    All other systems reviewed and are negative.      I performed a complete 10 point review of systems and it is negative except as noted in HPI or above.      PAST HISTORIES:       Past Medical History:  Patient Active Problem List   Diagnosis   • Weakness   • Varicose veins of left lower extremity   • Tubular adenoma   • Tendinitis of shoulder   • Spondylolisthesis, acquired   • Spinal stenosis of lumbar region   • SOB (shortness of breath)   • Right leg weakness   • Primary osteoarthritis of right hip   • Obesity, Class I, BMI 30-34.9   • OAB (overactive bladder)   • Neck pain   • Mixed stress and urge urinary incontinence   • Major depressive disorder in partial remission (CMS-HCC)   • Lumbosacral neuritis   • Inflammation of right sacroiliac joint (CMS-HCC)   • Incontinence of feces   • Incomplete bladder emptying   • Impingement syndrome of shoulder region   • High cholesterol   • Gastritis and gastroduodenitis   • Essential hypertension   • Dry skin dermatitis   • Loose stools   • Diaphragmatic hernia   • Cramping of hands   • Coccydynia   • Closed fracture of shaft of humerus   • Chronic obstructive pulmonary disease (Multi)   • Chronic kidney disease, stage 3, mod decreased GFR (Multi)   • DDD (degenerative disc disease), cervical   • Cervical spondylosis without myelopathy   • Bursitis of shoulder   • Atrophic vaginitis   • Age-related osteoporosis without current pathological fracture   • Actinic keratosis     She has a past medical history of Collagenous colitis (04/18/2022), Collagenous colitis (10/07/2024), Esophageal stricture (08/22/2013), Personal history of other specified conditions (06/15/2022), and Personal history of other specified conditions (06/15/2022).    Past Surgical History:  She has a past surgical history that includes Other surgical history (07/30/2020); Other surgical history (07/30/2020); Other  "surgical history (07/30/2020); Other surgical history (08/10/2020); and Other surgical history (08/10/2020).      Social History:  She reports that she has quit smoking. Her smoking use included cigarettes. She has never used smokeless tobacco. She reports that she does not drink alcohol and does not use drugs.    Family History:  No known family history of GI disease, specifically denies any family history of pancreatitis, Crohn's, colon cancer, gastroesophageal cancer, or ulcerative colitis.    No family history on file.     Allergies:  Nickel and Perfume      Objective  OBJECTIVE:       Last Recorded Vitals:  Vitals:    10/07/24 1511   BP: 147/85   Pulse: 89   SpO2: 94%   Weight: 78 kg (172 lb)   Height: 1.562 m (5' 1.5\")     /85   Pulse 89   Ht 1.562 m (5' 1.5\")   Wt 78 kg (172 lb)   SpO2 94%   BMI 31.97 kg/m²      Physical Exam:    Physical Exam  Vitals reviewed.   Constitutional:       General: She is not in acute distress.     Appearance: She is not ill-appearing.   HENT:      Head: Normocephalic and atraumatic.   Eyes:      General: No scleral icterus.  Cardiovascular:      Rate and Rhythm: Normal rate and regular rhythm.      Pulses: Normal pulses.      Heart sounds: Normal heart sounds. No murmur heard.  Pulmonary:      Effort: Pulmonary effort is normal. No respiratory distress.      Breath sounds: Normal breath sounds. No wheezing.   Abdominal:      General: Bowel sounds are normal.      Palpations: Abdomen is soft.      Tenderness: There is no abdominal tenderness. There is no rebound.   Musculoskeletal:         General: No swelling or deformity.   Skin:     General: Skin is warm and dry.      Coloration: Skin is not jaundiced.      Findings: No rash.   Neurological:      General: No focal deficit present.      Mental Status: She is alert and oriented to person, place, and time.   Psychiatric:         Mood and Affect: Mood normal.         Behavior: Behavior normal.         Thought Content: " Thought content normal.         Judgment: Judgment normal.         Home Medications:  Prior to Admission medications    Not on File         Relevant Results Recent labs reviewed in the EMR.    Lab Results   Component Value Date/Time    WBC 11.3 07/19/2024 1331    HGB 11.6 (L) 07/19/2024 1331    HGB 11.9 (L) 09/20/2023 1255    HGB 13.6 04/13/2023 1832    HGB 12.3 05/27/2022 1027    MCV 92 07/19/2024 1331     07/19/2024 1331     09/20/2023 1255     04/13/2023 1832     05/27/2022 1027       Lab Results   Component Value Date/Time     07/19/2024 1331    K 3.7 07/19/2024 1331     07/19/2024 1331    BUN 24 (H) 07/19/2024 1331    CREATININE 1.13 (H) 07/19/2024 1331    CREATININE 1.17 (H) 02/05/2024 1525       Lab Results   Component Value Date/Time    BILITOT 0.7 04/13/2023 1832    BILITOT 0.8 08/02/2020 1450    BILITOT 0.4 01/06/2020 1808    ALKPHOS 86 04/13/2023 1832    ALKPHOS 98 08/02/2020 1450    ALKPHOS 87 01/06/2020 1808    AST 15 04/13/2023 1832    AST 11 08/02/2020 1450    AST 13 01/06/2020 1808    ALT 13 04/13/2023 1832    ALT 8 08/02/2020 1450    ALT 10 01/06/2020 1808       Lab Results   Component Value Date/Time    CALPS 223 (H) 03/29/2022 1310       Radiology: Imaging reviewed in the EMR.  No results found.

## 2024-10-07 NOTE — PROGRESS NOTES
[facility] Gastroenterology    ASSESSMENT and PLAN:       Danielle Hernández is a 89 y.o. female with a significant past medical history of CKD, COPD, HTN, obesity, OA, HLD, depression, and spinal stenosis who presents for consultation requested by her primary care provider (Perla Coleman MD) for the evaluation of “history of urine incontinence with effective bladder stimulator, now have bowel incontinence, please evaluate and treat, may need additional stimulator”.       Loose stool and fecal incontinence  She has a long standing history of intermittent loose stool and urinary/fecal incontinence. There is a reported history of microscopic colitis (collagenous colitis), but current symptoms are not consistent with microscopic colitis. Suspect that many of her symptoms are related to pelvic floor dysfunction. I discussed the role of physical therapy and also recommend a bulk forming fiber supplement. On days when she has looser/frequent stool she could try using low dose Imodium, but would be cautious to avoid any issues with constipation. She previously has had adenomatous polyps, but at this age I would not recommend additional colonoscopy and she is in agreement stating that she would not want a colonoscopy even if one were recommended.      Follow up with GI as needed.          Houston Gould MD        Senior Attending Physician, Gastroenterology    [facility]    Clinical   [facility] [employer]        Subjective   HISTORY OF PRESENT ILLNESS:     Chief Complaint  Encopresis    History Of Present Illness:    Danielle Hernández is a 89 y.o. female with a significant past medical history of CKD, COPD, HTN, obesity, OA, HLD, depression, and spinal stenosis who presents for consultation requested by her primary care provider (Perla Coleman MD) for the evaluation of “history of urine incontinence  with effective bladder stimulator, now have bowel incontinence, please evaluate and treat, may need additional stimulator”.     A scanned note from her PCP dated 8/20/2024 noted that she was having fecal incontinence for 8 months.    She was seen by GI (Leatha De La Torre, XIN-CNP) in 2022 for diarrhea and fecal incontinence. At that time there was a reported history of collagenous colitis although after her initial visit reports from her previous colonoscopies were obtained and there were no biopsy results that showed microscopic colitis.  Colonoscopy was recommended, but patient refused. Labs were checked which included a normal stool pathogen panel, negative C diff, normal fecal elastase, and elevated calprotectin (223).    There is a scanned document that includes a handwritten page that indicates collagenous colitis, but no date is included and no formal pathology report is included.    At the time of her last follow up with Leatha De La Torre, a course of Budesonide was recommended. Today she says that she can't recall if she took that medication or not. She also can't say if her symptoms improved on Budesonide.      Currently she says that she does not have much control over the bowels. She can typically tell that she is going to have a BM, but she can't make it to the bathroom in time about 3 times per month. She says that she has had issues with bowel movements for years, but she can't say for sure how long her symptoms have been present. She usually has 2 BMs per day. She will at times have 3-5 BMs in a day and on those days stool is usually looser and that is often when she will have an accident. She says that she continues to have urinary incontinence. She follows with a urologist for her urinary frequency/incontinence and has undergone bladder stimulator implant in the past. She does say that her urologist has told her that she has pelvic floor weakness/dysfunction. No blood in her stools.      Patient denies any  heartburn/GERD, N/V, dysphagia, odynophagia, abdominal pain, constipation, hematemesis, hematochezia, melena, or weight loss.    Endoscopy History:  2016 - EGD: 2 membranous rings of distal esophagus dilated up to 18mm, normal stomach, normal duodenum  2/16/2017 - Colonoscopy: cecum polyp (TA on path), ascending polyp (TA on path), diverticulosis, hemorrhoids, otherwise normal mucosa  10/9/2020 - EGD: normal esophagus (esophagitis on path), esophagus empirically dilated, gastric erythema (gastritis with no H pylori on path), single gastric nodule, normal duodenum      Review of systems:   Review of Systems   Constitutional:  Positive for fatigue. Negative for chills, fever and unexpected weight change.   HENT:  Negative for congestion, sneezing, sore throat, trouble swallowing and voice change.    Respiratory:  Negative for cough, shortness of breath and wheezing.    Cardiovascular:  Negative for chest pain, palpitations and leg swelling.   Gastrointestinal:         As detailed above.   Genitourinary:  Positive for enuresis and frequency. Negative for dysuria and hematuria.   Musculoskeletal:  Positive for arthralgias. Negative for myalgias.   Skin:  Negative for pallor and rash.   Neurological:  Negative for dizziness, seizures, syncope, weakness, numbness and headaches.   Hematological:  Negative for adenopathy. Does not bruise/bleed easily.   Psychiatric/Behavioral:  Negative for confusion. The patient is not nervous/anxious.    All other systems reviewed and are negative.      I performed a complete 10 point review of systems and it is negative except as noted in HPI or above.      PAST HISTORIES:       Past Medical History:  Patient Active Problem List   Diagnosis    Weakness    Varicose veins of left lower extremity    Tubular adenoma    Tendinitis of shoulder    Spondylolisthesis, acquired    Spinal stenosis of lumbar region    SOB (shortness of breath)    Right leg weakness    Primary osteoarthritis of right  hip    Obesity, Class I, BMI 30-34.9    OAB (overactive bladder)    Neck pain    Mixed stress and urge urinary incontinence    Major depressive disorder in partial remission (CMS-HCC)    Lumbosacral neuritis    Inflammation of right sacroiliac joint (CMS-HCC)    Incontinence of feces    Incomplete bladder emptying    Impingement syndrome of shoulder region    High cholesterol    Gastritis and gastroduodenitis    Essential hypertension    Dry skin dermatitis    Loose stools    Diaphragmatic hernia    Cramping of hands    Coccydynia    Closed fracture of shaft of humerus    Chronic obstructive pulmonary disease (Multi)    Chronic kidney disease, stage 3, mod decreased GFR (Multi)    DDD (degenerative disc disease), cervical    Cervical spondylosis without myelopathy    Bursitis of shoulder    Atrophic vaginitis    Age-related osteoporosis without current pathological fracture    Actinic keratosis     She has a past medical history of Collagenous colitis (04/18/2022), Collagenous colitis (10/07/2024), Esophageal stricture (08/22/2013), Personal history of other specified conditions (06/15/2022), and Personal history of other specified conditions (06/15/2022).    Past Surgical History:  She has a past surgical history that includes Other surgical history (07/30/2020); Other surgical history (07/30/2020); Other surgical history (07/30/2020); Other surgical history (08/10/2020); and Other surgical history (08/10/2020).      Social History:  She reports that she has quit smoking. Her smoking use included cigarettes. She has never used smokeless tobacco. She reports that she does not drink alcohol and does not use drugs.    Family History:  No known family history of GI disease, specifically denies any family history of pancreatitis, Crohn's, colon cancer, gastroesophageal cancer, or ulcerative colitis.    No family history on file.     Allergies:  Nickel and Perfume      Objective   OBJECTIVE:       Last Recorded  "Vitals:  Vitals:    10/07/24 1511   BP: 147/85   Pulse: 89   SpO2: 94%   Weight: 78 kg (172 lb)   Height: 1.562 m (5' 1.5\")     /85   Pulse 89   Ht 1.562 m (5' 1.5\")   Wt 78 kg (172 lb)   SpO2 94%   BMI 31.97 kg/m²      Physical Exam:    Physical Exam  Vitals reviewed.   Constitutional:       General: She is not in acute distress.     Appearance: She is not ill-appearing.   HENT:      Head: Normocephalic and atraumatic.   Eyes:      General: No scleral icterus.  Cardiovascular:      Rate and Rhythm: Normal rate and regular rhythm.      Pulses: Normal pulses.      Heart sounds: Normal heart sounds. No murmur heard.  Pulmonary:      Effort: Pulmonary effort is normal. No respiratory distress.      Breath sounds: Normal breath sounds. No wheezing.   Abdominal:      General: Bowel sounds are normal.      Palpations: Abdomen is soft.      Tenderness: There is no abdominal tenderness. There is no rebound.   Musculoskeletal:         General: No swelling or deformity.   Skin:     General: Skin is warm and dry.      Coloration: Skin is not jaundiced.      Findings: No rash.   Neurological:      General: No focal deficit present.      Mental Status: She is alert and oriented to person, place, and time.   Psychiatric:         Mood and Affect: Mood normal.         Behavior: Behavior normal.         Thought Content: Thought content normal.         Judgment: Judgment normal.         Home Medications:  Prior to Admission medications    Not on File         Relevant Results Recent labs reviewed in the EMR.    Lab Results   Component Value Date/Time    WBC 11.3 07/19/2024 1331    HGB 11.6 (L) 07/19/2024 1331    HGB 11.9 (L) 09/20/2023 1255    HGB 13.6 04/13/2023 1832    HGB 12.3 05/27/2022 1027    MCV 92 07/19/2024 1331     07/19/2024 1331     09/20/2023 1255     04/13/2023 1832     05/27/2022 1027       Lab Results   Component Value Date/Time     07/19/2024 1331    K 3.7 07/19/2024 1331 "     07/19/2024 1331    BUN 24 (H) 07/19/2024 1331    CREATININE 1.13 (H) 07/19/2024 1331    CREATININE 1.17 (H) 02/05/2024 1525       Lab Results   Component Value Date/Time    BILITOT 0.7 04/13/2023 1832    BILITOT 0.8 08/02/2020 1450    BILITOT 0.4 01/06/2020 1808    ALKPHOS 86 04/13/2023 1832    ALKPHOS 98 08/02/2020 1450    ALKPHOS 87 01/06/2020 1808    AST 15 04/13/2023 1832    AST 11 08/02/2020 1450    AST 13 01/06/2020 1808    ALT 13 04/13/2023 1832    ALT 8 08/02/2020 1450    ALT 10 01/06/2020 1808       Lab Results   Component Value Date/Time    CALPS 223 (H) 03/29/2022 1310       Radiology: Imaging reviewed in the EMR.  No results found.

## 2024-10-08 PROBLEM — K52.831 COLLAGENOUS COLITIS: Status: RESOLVED | Noted: 2024-10-07 | Resolved: 2024-10-08

## 2024-10-08 PROBLEM — R19.5 LOOSE STOOLS: Status: ACTIVE | Noted: 2024-10-07

## 2024-10-08 ASSESSMENT — ENCOUNTER SYMPTOMS
NUMBNESS: 0
NERVOUS/ANXIOUS: 0
FATIGUE: 1
FREQUENCY: 1
TROUBLE SWALLOWING: 0
CHILLS: 0
VOICE CHANGE: 0
COUGH: 0
ROS GI COMMENTS: AS DETAILED ABOVE.
UNEXPECTED WEIGHT CHANGE: 0
WHEEZING: 0
PALPITATIONS: 0
HEADACHES: 0
WEAKNESS: 0
DYSURIA: 0
CONFUSION: 0
SEIZURES: 0
FEVER: 0
MYALGIAS: 0
HEMATURIA: 0
BRUISES/BLEEDS EASILY: 0
SHORTNESS OF BREATH: 0
DIZZINESS: 0
ARTHRALGIAS: 1
SORE THROAT: 0
ADENOPATHY: 0

## 2024-10-18 NOTE — PROGRESS NOTES
"HISTORY OF PRESENT ILLNESS:  Danielle Hernández is a 89 y.o. female who presents today for a follow up visit. She is doing okay. She has some issues with her bowels randomly, sometimes 2 times a week,  sometimes 2 times a month.  She is doing well regarding her bladder.          Past Medical History  She has a past medical history of Collagenous colitis (04/18/2022), Collagenous colitis (10/07/2024), Esophageal stricture (08/22/2013), Personal history of other specified conditions (06/15/2022), and Personal history of other specified conditions (06/15/2022).    Surgical History  She has a past surgical history that includes Other surgical history (07/30/2020); Other surgical history (07/30/2020); Other surgical history (07/30/2020); Other surgical history (08/10/2020); and Other surgical history (08/10/2020).     Social History  She reports that she has quit smoking. Her smoking use included cigarettes. She has never used smokeless tobacco. She reports that she does not drink alcohol and does not use drugs.    Family History  No family history on file.     Allergies  Nickel and Perfume      A comprehensive 10+ review of systems was negative except for: see hpi                          PHYSICAL EXAMINATION:  BP Readings from Last 3 Encounters:   10/07/24 147/85   05/09/23 137/81   04/13/23 180/82      Wt Readings from Last 3 Encounters:   10/07/24 78 kg (172 lb)   05/09/23 69.4 kg (153 lb)   05/27/22 66 kg (145 lb 8.1 oz)      BMI: Estimated body mass index is 31.97 kg/m² as calculated from the following:    Height as of 10/7/24: 1.562 m (5' 1.5\").    Weight as of 10/7/24: 78 kg (172 lb).  BSA: Estimated body surface area is 1.84 meters squared as calculated from the following:    Height as of 10/7/24: 1.562 m (5' 1.5\").    Weight as of 10/7/24: 78 kg (172 lb).  HEENT: Normocephalic, atraumatic, PER EOMI, nonicteric, trachea normal, thyroid normal, oropharynx normal.  CARDIAC: regular rate & rhythm, S1 & S2 normal.  No " heaves, thrills, gallops or murmurs.  LUNGS: Clear to auscultation, no spinal or CV tenderness.  EXTREMITIES: No evidence of cyanosis, clubbing or edema.               Assessment:  89-year-old with urinary urge incontinence, likely underactive detrusor, and fecal incontinence     UUI:  Has tried Ditropan Myrbetriq and PTNS with no improvement  s/p botox 100 units, 1/13/21, 8/17/2021  Continue Premarin cream   Increased stim and widened pulse width   Stopped Gemtesa  [employer] settings changed on 8/6/24        Stress incontinence:  S/p bulking  9/20/23 with marked improvement      FI:   Rx colestipol          Follow up in 3 months      All questions and concerns were answered and addressed.  The patient expressed understanding and agrees with the plan.     Abimael Umana MD    Scribe Attestation  By signing my name below, I, Joann Castanon, Scribe   attest that this documentation has been prepared under the direction and in the presence of Abimael Umana MD.

## 2024-10-22 ENCOUNTER — OFFICE VISIT (OUTPATIENT)
Facility: HOSPITAL | Age: 89
End: 2024-10-22
Payer: MEDICARE

## 2024-10-22 DIAGNOSIS — R15.9 INCONTINENCE OF FECES, UNSPECIFIED FECAL INCONTINENCE TYPE: Primary | ICD-10-CM

## 2024-10-22 DIAGNOSIS — N32.81 OAB (OVERACTIVE BLADDER): ICD-10-CM

## 2024-10-22 DIAGNOSIS — N39.3 SUI (STRESS URINARY INCONTINENCE, FEMALE): ICD-10-CM

## 2024-10-22 PROCEDURE — 99214 OFFICE O/P EST MOD 30 MIN: CPT | Performed by: STUDENT IN AN ORGANIZED HEALTH CARE EDUCATION/TRAINING PROGRAM

## 2024-10-22 PROCEDURE — 1159F MED LIST DOCD IN RCRD: CPT | Performed by: STUDENT IN AN ORGANIZED HEALTH CARE EDUCATION/TRAINING PROGRAM

## 2024-10-22 RX ORDER — MONTELUKAST SODIUM 4 MG/1
TABLET, CHEWABLE ORAL
Qty: 60 TABLET | Refills: 6 | Status: SHIPPED | OUTPATIENT
Start: 2024-10-22

## 2024-10-22 SDOH — ECONOMIC STABILITY: FOOD INSECURITY: WITHIN THE PAST 12 MONTHS, THE FOOD YOU BOUGHT JUST DIDN'T LAST AND YOU DIDN'T HAVE MONEY TO GET MORE.: NEVER TRUE

## 2024-10-22 SDOH — ECONOMIC STABILITY: FOOD INSECURITY: WITHIN THE PAST 12 MONTHS, YOU WORRIED THAT YOUR FOOD WOULD RUN OUT BEFORE YOU GOT MONEY TO BUY MORE.: NEVER TRUE

## 2024-10-22 ASSESSMENT — PATIENT HEALTH QUESTIONNAIRE - PHQ9
SUM OF ALL RESPONSES TO PHQ9 QUESTIONS 1 AND 2: 0
1. LITTLE INTEREST OR PLEASURE IN DOING THINGS: NOT AT ALL
2. FEELING DOWN, DEPRESSED OR HOPELESS: NOT AT ALL

## 2024-10-22 ASSESSMENT — COLUMBIA-SUICIDE SEVERITY RATING SCALE - C-SSRS
1. IN THE PAST MONTH, HAVE YOU WISHED YOU WERE DEAD OR WISHED YOU COULD GO TO SLEEP AND NOT WAKE UP?: NO
6. HAVE YOU EVER DONE ANYTHING, STARTED TO DO ANYTHING, OR PREPARED TO DO ANYTHING TO END YOUR LIFE?: NO
2. HAVE YOU ACTUALLY HAD ANY THOUGHTS OF KILLING YOURSELF?: NO

## 2024-12-05 ENCOUNTER — HOSPITAL ENCOUNTER (OUTPATIENT)
Dept: RADIOLOGY | Facility: CLINIC | Age: 89
Discharge: HOME | End: 2024-12-05
Payer: MEDICARE

## 2024-12-05 DIAGNOSIS — M25.562 PAIN IN LEFT KNEE: ICD-10-CM

## 2024-12-05 PROCEDURE — 73562 X-RAY EXAM OF KNEE 3: CPT | Mod: LT

## 2025-01-28 ENCOUNTER — OFFICE VISIT (OUTPATIENT)
Facility: HOSPITAL | Age: OVER 89
End: 2025-01-28
Payer: MEDICARE

## 2025-01-28 DIAGNOSIS — R15.9 INCONTINENCE OF FECES, UNSPECIFIED FECAL INCONTINENCE TYPE: ICD-10-CM

## 2025-01-28 DIAGNOSIS — N32.81 OAB (OVERACTIVE BLADDER): Primary | ICD-10-CM

## 2025-01-28 PROCEDURE — 1159F MED LIST DOCD IN RCRD: CPT | Performed by: STUDENT IN AN ORGANIZED HEALTH CARE EDUCATION/TRAINING PROGRAM

## 2025-01-28 PROCEDURE — G2211 COMPLEX E/M VISIT ADD ON: HCPCS | Performed by: STUDENT IN AN ORGANIZED HEALTH CARE EDUCATION/TRAINING PROGRAM

## 2025-01-28 PROCEDURE — 99214 OFFICE O/P EST MOD 30 MIN: CPT | Performed by: STUDENT IN AN ORGANIZED HEALTH CARE EDUCATION/TRAINING PROGRAM

## 2025-01-28 RX ORDER — COLESTIPOL HYDROCHLORIDE 1 G/1
TABLET ORAL
Qty: 60 TABLET | Refills: 11 | Status: SHIPPED | OUTPATIENT
Start: 2025-01-28

## 2025-01-28 SDOH — ECONOMIC STABILITY: FOOD INSECURITY: WITHIN THE PAST 12 MONTHS, THE FOOD YOU BOUGHT JUST DIDN'T LAST AND YOU DIDN'T HAVE MONEY TO GET MORE.: NEVER TRUE

## 2025-01-28 SDOH — ECONOMIC STABILITY: FOOD INSECURITY: WITHIN THE PAST 12 MONTHS, YOU WORRIED THAT YOUR FOOD WOULD RUN OUT BEFORE YOU GOT MONEY TO BUY MORE.: NEVER TRUE

## 2025-01-28 ASSESSMENT — PATIENT HEALTH QUESTIONNAIRE - PHQ9
SUM OF ALL RESPONSES TO PHQ9 QUESTIONS 1 AND 2: 0
2. FEELING DOWN, DEPRESSED OR HOPELESS: NOT AT ALL
1. LITTLE INTEREST OR PLEASURE IN DOING THINGS: NOT AT ALL

## 2025-01-28 ASSESSMENT — COLUMBIA-SUICIDE SEVERITY RATING SCALE - C-SSRS
1. IN THE PAST MONTH, HAVE YOU WISHED YOU WERE DEAD OR WISHED YOU COULD GO TO SLEEP AND NOT WAKE UP?: NO
2. HAVE YOU ACTUALLY HAD ANY THOUGHTS OF KILLING YOURSELF?: NO

## 2025-01-28 ASSESSMENT — ENCOUNTER SYMPTOMS
DEPRESSION: 0
LOSS OF SENSATION IN FEET: 0
OCCASIONAL FEELINGS OF UNSTEADINESS: 1

## 2025-01-28 NOTE — PROGRESS NOTES
"HISTORY OF PRESENT ILLNESS:  Danielle Hernández is a 89 y.o. female who presents today for a follow up visit. She reports that she is doing well with the colestipol. She is also doing well regarding her bladder. She did have some leakage with cough, but it has improved. She is happy with her improvement at this time. She would like to start gemtesa back up again.          Past Medical History  She has a past medical history of Collagenous colitis (04/18/2022), Collagenous colitis (10/07/2024), Esophageal stricture (08/22/2013), Personal history of other specified conditions (06/15/2022), and Personal history of other specified conditions (06/15/2022).    Surgical History  She has a past surgical history that includes Other surgical history (07/30/2020); Other surgical history (07/30/2020); Other surgical history (07/30/2020); Other surgical history (08/10/2020); and Other surgical history (08/10/2020).     Social History  She reports that she has quit smoking. Her smoking use included cigarettes. She has never used smokeless tobacco. She reports that she does not drink alcohol and does not use drugs.    Family History  No family history on file.     Allergies  Nickel and Perfume      A comprehensive 10+ review of systems was negative except for: see hpi                          PHYSICAL EXAMINATION:  BP Readings from Last 3 Encounters:   10/07/24 147/85   05/09/23 137/81   04/13/23 180/82      Wt Readings from Last 3 Encounters:   10/07/24 78 kg (172 lb)   05/09/23 69.4 kg (153 lb)   05/27/22 66 kg (145 lb 8.1 oz)      BMI: Estimated body mass index is 31.97 kg/m² as calculated from the following:    Height as of 10/7/24: 1.562 m (5' 1.5\").    Weight as of 10/7/24: 78 kg (172 lb).  BSA: Estimated body surface area is 1.84 meters squared as calculated from the following:    Height as of 10/7/24: 1.562 m (5' 1.5\").    Weight as of 10/7/24: 78 kg (172 lb).  HEENT: Normocephalic, atraumatic, PER EOMI, nonicteric, trachea " normal, thyroid normal, oropharynx normal.  CARDIAC: regular rate & rhythm, S1 & S2 normal.  No heaves, thrills, gallops or murmurs.  LUNGS: Clear to auscultation, no spinal or CV tenderness.  EXTREMITIES: No evidence of cyanosis, clubbing or edema.              Assessment:  89-year-old with urinary urge incontinence, likely underactive detrusor, and fecal incontinence     UUI:  Has tried Ditropan Myrbetriq and PTNS with no improvement  s/p botox 100 units, 1/13/21, 8/17/2021  Continue Premarin cream   Increased stim and widened pulse width   Rx Gemtesa  [employer] settings changed on 8/6/24  Referral to  clinical pharmacy for gemtesa       Stress incontinence:  S/p bulking  9/20/23 with marked improvement        FI:   Rx colestipol       Follow up in 3 months , if doing good, can f/up in 6 to 12 months      All questions and concerns were answered and addressed.  The patient expressed understanding and agrees with the plan.     Abimael Umana MD    Scribe Attestation  By signing my name below, I, Joann Castanon, Scribe   attest that this documentation has been prepared under the direction and in the presence of Abimael Umana MD.

## 2025-03-12 NOTE — PROGRESS NOTES
Patient ID: Danielle Hernández is a 89 y.o. female who presents for No chief complaint on file..    Referring Provider:  Abimael Umana  Pt was referred for gemtesa assistance    Preferred Pharmacy:    [facility] - [place], [place] - [address] [address]  Phone: [phone] Fax: [phone]      Copay assistance:   Do you have copays on your medications? Yes  Do you have trouble affording your current medications? Yes     Patient Assistance Screening (VAF)    Patient verbally reports monthly or yearly income which is less than 400% federal poverty level   Application for program has been submitted for the following medications:   Gemtesa  Patient has been informed that program team will be reaching out to them to discuss necessary documentation, instructed to answer phone/return voicemail.   Patient aware this process may take up to 6 weeks.   If approved medication must be filled through [facility] pharmacy and may be picked up or mailed to patient.       Subjective        Urinary Symptoms  Medications that may contribute to symptoms:   diuretics, anticholinergics, alpha blockers (doxazosin, prazosin, terazosin), tricyclic antidepressants, antipsychotics (lithium, clozapine), calcium channel blockers (causes bladder to relax and affects ability to empty properly), opioids (impair bladder contraction), antihistamines (diphenhydramine, chlorpheniramine), pseudoephedrine, phenylephrine, SGLT2 inhibitors (increases the amount of glucose and fluid excreted through kidneys).   Any history of:   Narrow-angle glaucoma? No  Impaired gastric emptying? No  Urinary retention? No  If yes to any of the above use caution/avoid antimuscarinic medications    If diabetes, blood sugar controlled? Pre-diabetes  Frequently of bowel movement? Variable, every other day to 3-4 times daily  Tobacco use? No  How many protective undergarments are you utilizing daily? 3/day up to 7/day on a bad day, wears  "depends always.     What medications have been tried/stopped?   Ditropan  Myrbetriq  Current medication? None  When started?   Side effects?   Improvement in symptoms?       Cardiovascular Health  The ASCVD Risk score (Sarah TRACY, et al., 2019) failed to calculate for the following reasons:    The 2019 ASCVD risk score is only valid for ages 40 to 79    Risk score cannot be calculated because patient has a medical history suggesting prior/existing ASCVD    No results found for: \"CHOL\"  No results found for: \"HDL\"  No results found for: \"LDLCALC\"  No results found for: \"TRIG\"  No components found for: \"CHOLHDL\"        Blood Sugar Balance  Lab Results   Component Value Date    GLUCOSE 105 (H) 07/19/2024     No results found for: \"LEPTIN\", \"INSULFAST\", \"GLUF\"      Thyroid  Lab Results   Component Value Date    TSH 3.89 07/19/2024       Iron Status  No results found for: \"IRON\", \"TIBC\", \"FERRITIN\"     Kidney Function  Lab Results   Component Value Date    GFRF 50 (A) 09/20/2023    CREATININE 1.13 (H) 07/19/2024       Potassium  Lab Results   Component Value Date    K 3.7 07/19/2024        Vitamin D3  Lab Results   Component Value Date    VITD25 25 (L) 07/19/2024       Current Outpatient Medications on File Prior to Visit   Medication Sig Dispense Refill    acetaminophen (Tylenol) 500 mg capsule every 6 hours.      albuterol 90 mcg/actuation inhaler every 4 hours.      atorvastatin (Lipitor) 10 mg tablet Take by mouth once daily.      cephalexin (Keflex) 250 mg capsule Take 1 capsule (250 mg) by mouth once daily.      colestipol (Colestid) 1 gram tablet Take 1 every other day 60 tablet 6    colestipol (Colestid) 1 gram tablet Take 1 every other day for 2 weeks, if no improvement take every day for 2 weeks, if no improvement after that take 2x/day 60 tablet 11    fluticasone (Flonase) 50 mcg/actuation nasal spray Administer 1 spray into each nostril 2 times a day.      gabapentin (Neurontin) 100 mg capsule Take 3 capsules " (300 mg) by mouth 3 times a day as needed.      hydroCHLOROthiazide (HYDRODiuril) 25 mg tablet Take 1 tablet (25 mg) by mouth once daily in the morning. Take before meals.      losartan (Cozaar) 100 mg tablet Take 1 tablet (100 mg) by mouth once daily.      methylcellulose, laxative, (CitruceL) 500 mg tablet every 4 hours.      omeprazole (PriLOSEC) 20 mg DR capsule once every 24 hours.       No current facility-administered medications on file prior to visit.        Medication and allergy reconciliation completed     Drug Interactions   No significant drug interactions identified    Assessment/Plan     Patient is experiencing urinary frequency, urgency, and incontinence. She is using up to 7 protective undergarments daily. She is currently not on any medication for her bladder, she has a bladder implant.   Has tried before:  Myrbetriq- didn't work  Ditropan- didn't work  Patient plans to apply for [facility], 1099 via email - send to son (Garth)  Patient confirms their income is within the below guidelines  Patient confirms they live in [place] [facility]  Patient confirms they have current insurance that covers medications  Patient confirms they are not part of the Medicare Prescription Payment Program (MPPP)    2025 Poverty Guidelines     Persons in family/household 400% federal Poverty Limit    1 62,600    2 84,600    3 106,600    4 128,600    5 150,600    6 172,600    7 194,600    8 216,600        Gemtesa   Discussed MOA: works by activating beta-3 adrenergic receptors in the bladder resulting in relaxation of the detrusor smooth muscle during the urine storage phase, thus increasing bladder capacity.  Provided education on administration and potential side effects including but not limited to hypertension 9%, hot flashes <2%, constipation/diarrhea <2%, dry mouth <2%, and headache <4%.   Gemtesa (vibegron) starts working almost immediately - within a few days of first taking it, with noticeable improvements in urinary  urgency, frequency, and incontinence noted in clinical trials at 2 weeks which were reported as significant by 12 weeks.      LABS  Lab Results   Component Value Date    GFRF 50 (A) 09/20/2023           START  Gemtesa 75 mg once daily    Follow-up: 4/17/25 1:20pm     Time spent with pt: Total length of time 20 (minutes) of the encounter and more than 50% was spent counseling the patient.      Dipika Friedman, Pharm.D, [facility], [facility]  Clinical Pharmacist  Pharmacy Services  [phone]    Continue all meds under the continuation of care with the referring provider and clinical pharmacy team.    Verbal consent to manage patient's drug therapy was obtained from the patient and/or an individual authorized to act on behalf of a patient. They were informed they may decline to participate or withdraw from participation in pharmacy services at any time.

## 2025-03-13 ENCOUNTER — APPOINTMENT (OUTPATIENT)
Dept: PHARMACY | Facility: HOSPITAL | Age: OVER 89
End: 2025-03-13
Payer: MEDICARE

## 2025-03-13 DIAGNOSIS — N32.81 OAB (OVERACTIVE BLADDER): ICD-10-CM

## 2025-03-13 RX ORDER — VIBEGRON 75 MG/1
75 TABLET, FILM COATED ORAL DAILY
Qty: 90 TABLET | Refills: 3 | Status: SHIPPED | OUTPATIENT
Start: 2025-03-13

## 2025-03-24 PROCEDURE — RXMED WILLOW AMBULATORY MEDICATION CHARGE

## 2025-03-27 ENCOUNTER — PHARMACY VISIT (OUTPATIENT)
Dept: PHARMACY | Facility: CLINIC | Age: OVER 89
End: 2025-03-27
Payer: COMMERCIAL

## 2025-04-15 NOTE — PROGRESS NOTES
Patient ID: Danielle Hernández is a 89 y.o. female who presents for No chief complaint on file..    Referring Provider:  Abimael Umana  Pt was referred for gemtesa assistance    Preferred Pharmacy:    [facility] - [place], [place] - [address] [facility]  [address] [facility] [address]  Phone: [phone] Fax: [phone]    [facility]  [address], [address]  [facility] [address]  Phone: [phone] Fax: [phone]      Copay assistance:   Do you have copays on your medications? Yes  Do you have trouble affording your current medications? Yes     Patient Assistance Screening (VAF)    Patient verbally reports monthly or yearly income which is less than 400% federal poverty level   Application for program has been submitted for the following medications:   Gemtesa  Patient has been informed that program team will be reaching out to them to discuss necessary documentation, instructed to answer phone/return voicemail.   Patient aware this process may take up to 6 weeks.   If approved [facility] and may be picked up or mailed to patient.       Subjective        Urinary Symptoms  Medications that may contribute to symptoms:   diuretics, anticholinergics, alpha blockers (doxazosin, prazosin, terazosin), tricyclic antidepressants, antipsychotics (lithium, clozapine), calcium channel blockers (causes bladder to relax and affects ability to empty properly), opioids (impair bladder contraction), antihistamines (diphenhydramine, chlorpheniramine), pseudoephedrine, phenylephrine, SGLT2 inhibitors (increases the amount of glucose and fluid excreted through kidneys).   Any history of:   Narrow-angle glaucoma? No  Impaired gastric emptying? No  Urinary retention? No  If yes to any of the above use caution/avoid antimuscarinic medications    If diabetes, blood sugar controlled? Pre-diabetes  Frequently of bowel movement? Variable, every other day to 3-4 times  "daily  Tobacco use? No  How many protective undergarments are you utilizing daily? 3/day up to 7/day on a bad day, wears depends always.     What medications have been tried/stopped?   Ditropan  Myrbetriq  Current medication? None  When started?   Side effects?   Improvement in symptoms?       Cardiovascular Health  The ASCVD Risk score (Sarah TRACY, et al., 2019) failed to calculate for the following reasons:    The 2019 ASCVD risk score is only valid for ages 40 to 79    Risk score cannot be calculated because patient has a medical history suggesting prior/existing ASCVD    No results found for: \"CHOL\"  No results found for: \"HDL\"  No results found for: \"LDLCALC\"  No results found for: \"TRIG\"  No components found for: \"CHOLHDL\"        Blood Sugar Balance  Lab Results   Component Value Date    GLUCOSE 105 (H) 07/19/2024     No results found for: \"LEPTIN\", \"INSULFAST\", \"GLUF\"      Thyroid  Lab Results   Component Value Date    TSH 3.89 07/19/2024       Iron Status  No results found for: \"IRON\", \"TIBC\", \"FERRITIN\"     Kidney Function  Lab Results   Component Value Date    GFRF 50 (A) 09/20/2023    CREATININE 1.13 (H) 07/19/2024       Potassium  Lab Results   Component Value Date    K 3.7 07/19/2024        Vitamin D3  Lab Results   Component Value Date    VITD25 25 (L) 07/19/2024       Current Outpatient Medications on File Prior to Visit   Medication Sig Dispense Refill    acetaminophen (Tylenol) 500 mg capsule every 6 hours.      albuterol 90 mcg/actuation inhaler every 4 hours.      atorvastatin (Lipitor) 10 mg tablet Take by mouth once daily.      cephalexin (Keflex) 250 mg capsule Take 1 capsule (250 mg) by mouth once daily.      colestipol (Colestid) 1 gram tablet Take 1 every other day for 2 weeks, if no improvement take every day for 2 weeks, if no improvement after that take 2x/day 60 tablet 11    fluticasone (Flonase) 50 mcg/actuation nasal spray Administer 1 spray into each nostril 2 times a day.      " gabapentin (Neurontin) 100 mg capsule Take 3 capsules (300 mg) by mouth 3 times a day as needed.      hydroCHLOROthiazide (HYDRODiuril) 25 mg tablet Take 1 tablet (25 mg) by mouth once daily in the morning. Take before meals.      losartan (Cozaar) 100 mg tablet Take 1 tablet (100 mg) by mouth once daily.      methylcellulose, laxative, (CitruceL) 500 mg tablet every 4 hours.      omeprazole (PriLOSEC) 20 mg DR capsule once every 24 hours.      vibegron (Gemtesa) 75 mg tablet Take 1 tablet (75 mg) by mouth once daily. 90 tablet 3     No current facility-administered medications on file prior to visit.        Medication and allergy reconciliation completed     Drug Interactions   No significant drug interactions identified    Assessment/Plan     Patient is experiencing urinary frequency, urgency, and incontinence. She is using up to 7 protective undergarments daily. She is currently not on any medication for her bladder, she has a bladder implant.   Has tried before:  Myrbetriq- didn't work  Ditropan- didn't work  Gemtesa  Tolerating well  No side effects  Many nights where she didn't have to get up all night.   Going 8-11 times daily now, had been 20. Significant improvement.   Patient approved for [facility] 3/24/25-3/24/26      LABS  Lab Results   Component Value Date    GFRF 50 (A) 09/20/2023           CONTINUE  Gemtesa 75 mg once daily    Follow-up: 10/16/25 4pm     Time spent with pt: Total length of time 10 (minutes) of the encounter and more than 50% was spent counseling the patient.      Dipika Friedman, Pharm.D, [facility], [facility]  Clinical Pharmacist  Pharmacy Services  [phone]    Continue all meds under the continuation of care with the referring provider and clinical pharmacy team.    Verbal consent to manage patient's drug therapy was obtained from the patient and/or an individual authorized to act on behalf of a patient. They were informed they may decline to participate or withdraw from participation in  pharmacy services at any time.

## 2025-04-17 ENCOUNTER — APPOINTMENT (OUTPATIENT)
Dept: PHARMACY | Facility: HOSPITAL | Age: OVER 89
End: 2025-04-17
Payer: MEDICARE

## 2025-04-17 DIAGNOSIS — N32.81 OAB (OVERACTIVE BLADDER): ICD-10-CM

## 2025-04-19 PROCEDURE — RXMED WILLOW AMBULATORY MEDICATION CHARGE

## 2025-04-23 ENCOUNTER — PHARMACY VISIT (OUTPATIENT)
Dept: PHARMACY | Facility: CLINIC | Age: OVER 89
End: 2025-04-23
Payer: COMMERCIAL

## 2025-04-29 ENCOUNTER — APPOINTMENT (OUTPATIENT)
Facility: HOSPITAL | Age: OVER 89
End: 2025-04-29
Payer: MEDICARE

## 2025-05-22 PROCEDURE — RXMED WILLOW AMBULATORY MEDICATION CHARGE

## 2025-05-23 ENCOUNTER — PHARMACY VISIT (OUTPATIENT)
Dept: PHARMACY | Facility: CLINIC | Age: OVER 89
End: 2025-05-23
Payer: COMMERCIAL

## 2025-06-06 ENCOUNTER — APPOINTMENT (OUTPATIENT)
Dept: UROLOGY | Facility: CLINIC | Age: OVER 89
End: 2025-06-06
Payer: MEDICARE

## 2025-06-18 PROCEDURE — RXMED WILLOW AMBULATORY MEDICATION CHARGE

## 2025-06-23 ENCOUNTER — PHARMACY VISIT (OUTPATIENT)
Dept: PHARMACY | Facility: CLINIC | Age: OVER 89
End: 2025-06-23
Payer: COMMERCIAL

## 2025-07-16 PROCEDURE — RXMED WILLOW AMBULATORY MEDICATION CHARGE

## 2025-07-21 ENCOUNTER — PHARMACY VISIT (OUTPATIENT)
Dept: PHARMACY | Facility: CLINIC | Age: OVER 89
End: 2025-07-21
Payer: COMMERCIAL

## 2025-08-15 PROCEDURE — RXMED WILLOW AMBULATORY MEDICATION CHARGE

## 2025-08-19 ENCOUNTER — PHARMACY VISIT (OUTPATIENT)
Dept: PHARMACY | Facility: CLINIC | Age: OVER 89
End: 2025-08-19
Payer: COMMERCIAL

## 2025-09-02 ENCOUNTER — APPOINTMENT (OUTPATIENT)
Dept: UROLOGY | Facility: CLINIC | Age: OVER 89
End: 2025-09-02
Payer: MEDICARE

## 2025-10-16 ENCOUNTER — APPOINTMENT (OUTPATIENT)
Dept: PHARMACY | Facility: HOSPITAL | Age: OVER 89
End: 2025-10-16
Payer: MEDICARE

## 2025-11-04 ENCOUNTER — APPOINTMENT (OUTPATIENT)
Dept: UROLOGY | Facility: CLINIC | Age: OVER 89
End: 2025-11-04
Payer: MEDICARE

## 2025-11-11 ENCOUNTER — APPOINTMENT (OUTPATIENT)
Dept: UROLOGY | Facility: CLINIC | Age: OVER 89
End: 2025-11-11
Payer: MEDICARE